# Patient Record
Sex: MALE | Race: WHITE | NOT HISPANIC OR LATINO | Employment: UNEMPLOYED | URBAN - METROPOLITAN AREA
[De-identification: names, ages, dates, MRNs, and addresses within clinical notes are randomized per-mention and may not be internally consistent; named-entity substitution may affect disease eponyms.]

---

## 2020-09-14 ENCOUNTER — HOSPITAL ENCOUNTER (EMERGENCY)
Facility: HOSPITAL | Age: 30
Discharge: PSYCHIATRIC OR SUBSTANCE USE FACILITY | End: 2020-09-15
Attending: EMERGENCY MEDICINE
Payer: MEDICAID

## 2020-09-14 DIAGNOSIS — F32.A DEPRESSION, UNSPECIFIED DEPRESSION TYPE: ICD-10-CM

## 2020-09-14 DIAGNOSIS — F32.A DEPRESSION WITH SUICIDAL IDEATION: Primary | ICD-10-CM

## 2020-09-14 DIAGNOSIS — R45.851 DEPRESSION WITH SUICIDAL IDEATION: Primary | ICD-10-CM

## 2020-09-14 LAB
ALBUMIN SERPL BCP-MCNC: 3.9 G/DL (ref 3.5–5.2)
ALP SERPL-CCNC: 57 U/L (ref 38–126)
ALT SERPL W/O P-5'-P-CCNC: 24 U/L (ref 10–44)
AMPHET+METHAMPHET UR QL: NORMAL
ANION GAP SERPL CALC-SCNC: 5 MMOL/L (ref 8–16)
APAP SERPL-MCNC: 27 UG/ML (ref 10–20)
APAP SERPL-MCNC: <10 UG/ML (ref 10–20)
AST SERPL-CCNC: 52 U/L (ref 15–46)
BACTERIA #/AREA URNS AUTO: ABNORMAL /HPF
BARBITURATES UR QL SCN>200 NG/ML: NEGATIVE
BASOPHILS # BLD AUTO: 0.05 K/UL (ref 0–0.2)
BASOPHILS NFR BLD: 0.6 % (ref 0–1.9)
BENZODIAZ UR QL SCN>200 NG/ML: NEGATIVE
BILIRUB SERPL-MCNC: 0.3 MG/DL (ref 0.1–1)
BILIRUB UR QL STRIP: ABNORMAL
BUN SERPL-MCNC: 18 MG/DL (ref 2–20)
BZE UR QL SCN: NEGATIVE
CALCIUM SERPL-MCNC: 8.7 MG/DL (ref 8.7–10.5)
CANNABINOIDS UR QL SCN: NORMAL
CHLORIDE SERPL-SCNC: 102 MMOL/L (ref 95–110)
CLARITY UR REFRACT.AUTO: ABNORMAL
CO2 SERPL-SCNC: 27 MMOL/L (ref 23–29)
COLOR UR AUTO: ABNORMAL
CREAT SERPL-MCNC: 1.17 MG/DL (ref 0.5–1.4)
CREAT UR-MCNC: >346.5 MG/DL (ref 23–375)
DIFFERENTIAL METHOD: ABNORMAL
EOSINOPHIL # BLD AUTO: 0 K/UL (ref 0–0.5)
EOSINOPHIL NFR BLD: 0.1 % (ref 0–8)
ERYTHROCYTE [DISTWIDTH] IN BLOOD BY AUTOMATED COUNT: 11.9 % (ref 11.5–14.5)
EST. GFR  (AFRICAN AMERICAN): >60 ML/MIN/1.73 M^2
EST. GFR  (NON AFRICAN AMERICAN): >60 ML/MIN/1.73 M^2
ETHANOL SERPL-MCNC: <10 MG/DL
GLUCOSE SERPL-MCNC: 106 MG/DL (ref 70–110)
GLUCOSE UR QL STRIP: NEGATIVE
HCT VFR BLD AUTO: 37.7 % (ref 40–54)
HGB BLD-MCNC: 13 G/DL (ref 14–18)
HGB UR QL STRIP: ABNORMAL
HYALINE CASTS UR QL AUTO: 0 /LPF
IMM GRANULOCYTES # BLD AUTO: 0.04 K/UL (ref 0–0.04)
IMM GRANULOCYTES NFR BLD AUTO: 0.5 % (ref 0–0.5)
KETONES UR QL STRIP: ABNORMAL
LEUKOCYTE ESTERASE UR QL STRIP: ABNORMAL
LYMPHOCYTES # BLD AUTO: 2.2 K/UL (ref 1–4.8)
LYMPHOCYTES NFR BLD: 25.1 % (ref 18–48)
MCH RBC QN AUTO: 33.4 PG (ref 27–31)
MCHC RBC AUTO-ENTMCNC: 34.5 G/DL (ref 32–36)
MCV RBC AUTO: 97 FL (ref 82–98)
METHADONE UR QL SCN>300 NG/ML: NEGATIVE
MICROSCOPIC COMMENT: ABNORMAL
MONOCYTES # BLD AUTO: 0.6 K/UL (ref 0.3–1)
MONOCYTES NFR BLD: 6.4 % (ref 4–15)
NEUTROPHILS # BLD AUTO: 5.8 K/UL (ref 1.8–7.7)
NEUTROPHILS NFR BLD: 67.3 % (ref 38–73)
NITRITE UR QL STRIP: NEGATIVE
NRBC BLD-RTO: 0 /100 WBC
OPIATES UR QL SCN: NEGATIVE
PCP UR QL SCN>25 NG/ML: NEGATIVE
PH UR STRIP: 5 [PH] (ref 5–8)
PLATELET # BLD AUTO: 317 K/UL (ref 150–350)
PMV BLD AUTO: 10 FL (ref 9.2–12.9)
POTASSIUM SERPL-SCNC: 3.5 MMOL/L (ref 3.5–5.1)
PROT SERPL-MCNC: 6.6 G/DL (ref 6–8.4)
PROT UR QL STRIP: ABNORMAL
RBC # BLD AUTO: 3.89 M/UL (ref 4.6–6.2)
RBC #/AREA URNS AUTO: >100 /HPF (ref 0–4)
SALICYLATES SERPL-MCNC: <5 MG/DL (ref 15–30)
SARS-COV-2 RDRP RESP QL NAA+PROBE: NEGATIVE
SODIUM SERPL-SCNC: 134 MMOL/L (ref 136–145)
SP GR UR STRIP: 1.01 (ref 1–1.03)
TOXICOLOGY INFORMATION: NORMAL
URN SPEC COLLECT METH UR: ABNORMAL
UROBILINOGEN UR STRIP-ACNC: ABNORMAL EU/DL
WBC # BLD AUTO: 8.65 K/UL (ref 3.9–12.7)
WBC #/AREA URNS AUTO: 2 /HPF (ref 0–5)

## 2020-09-14 PROCEDURE — U0002 COVID-19 LAB TEST NON-CDC: HCPCS | Mod: ER

## 2020-09-14 PROCEDURE — 90833 PR PSYCHOTHERAPY W/PATIENT W/E&M, 30 MIN (ADD ON): ICD-10-PCS | Mod: 95,AF,HB, | Performed by: PSYCHIATRY & NEUROLOGY

## 2020-09-14 PROCEDURE — 80307 DRUG TEST PRSMV CHEM ANLYZR: CPT | Mod: ER

## 2020-09-14 PROCEDURE — 90833 PSYTX W PT W E/M 30 MIN: CPT | Mod: 95,AF,HB, | Performed by: PSYCHIATRY & NEUROLOGY

## 2020-09-14 PROCEDURE — 81000 URINALYSIS NONAUTO W/SCOPE: CPT | Mod: 59,ER

## 2020-09-14 PROCEDURE — 99215 OFFICE O/P EST HI 40 MIN: CPT | Mod: 95,AF,HB, | Performed by: PSYCHIATRY & NEUROLOGY

## 2020-09-14 PROCEDURE — 99285 EMERGENCY DEPT VISIT HI MDM: CPT | Mod: ER

## 2020-09-14 PROCEDURE — 80329 ANALGESICS NON-OPIOID 1 OR 2: CPT | Mod: ER

## 2020-09-14 PROCEDURE — 99215 PR OFFICE/OUTPT VISIT, EST, LEVL V, 40-54 MIN: ICD-10-PCS | Mod: 95,AF,HB, | Performed by: PSYCHIATRY & NEUROLOGY

## 2020-09-14 PROCEDURE — 80053 COMPREHEN METABOLIC PANEL: CPT | Mod: ER

## 2020-09-14 PROCEDURE — 80320 DRUG SCREEN QUANTALCOHOLS: CPT | Mod: ER

## 2020-09-14 PROCEDURE — 85025 COMPLETE CBC W/AUTO DIFF WBC: CPT | Mod: ER

## 2020-09-14 NOTE — ED NOTES
"Pt c/o p[ain to both feet "I have sore spots on my feet because I been walking so much lately and I need something for pain on my feet" pain 6/10. Dr Lo notified. Dr Lo ordered repeat acetaminophen level ordered. Pt notified.   "

## 2020-09-14 NOTE — ED PROVIDER NOTES
Chief Complaint  Chief Complaint   Patient presents with    Suicidal     My mind is not thinking right.I had a knife but i threw it out when i called the ambulance. I want to hurt myself. My ex played me and ran off with my kids and left me with nothing and I just dont want to be here anymore       HPI  Frankie Fragoso is a 30 y.o. male who presents with depression and stress.  Patient reports he is struggling coping with his depression.  His ex-wife ran off with his kids per his account.  He has not tried to harm himself but he consider hurting himself a knife.  He denies any homicidal ideation.  No hallucinations.  No fever vomiting or diarrhea or cough.  No exacerbating or relieving factors.  The patient did not take any overdose or pills today.  He denies intoxication    Past medical history  Past Medical History:   Diagnosis Date    Anxiety     Back pain     Depression        Current Medications  No current facility-administered medications for this encounter.   No current outpatient medications on file.    Allergies  Review of patient's allergies indicates:   Allergen Reactions    Ceclor [cefaclor]        Surgical history  Past Surgical History:   Procedure Laterality Date    ENDOSCOPIC RELEASE OF BOTH CARPAL TUNNELS      KNEE ARTHROSCOPY         Social history  Social History     Socioeconomic History    Marital status: Unknown     Spouse name: Not on file    Number of children: Not on file    Years of education: Not on file    Highest education level: Not on file   Occupational History    Not on file   Social Needs    Financial resource strain: Not on file    Food insecurity     Worry: Not on file     Inability: Not on file    Transportation needs     Medical: Not on file     Non-medical: Not on file   Tobacco Use    Smoking status: Current Every Day Smoker     Packs/day: 0.50     Types: Cigarettes    Smokeless tobacco: Never Used   Substance and Sexual Activity    Alcohol use: Not  "Currently    Drug use: Yes     Types: Methamphetamines     Comment: meth     Sexual activity: Not on file   Lifestyle    Physical activity     Days per week: Not on file     Minutes per session: Not on file    Stress: Not on file   Relationships    Social connections     Talks on phone: Not on file     Gets together: Not on file     Attends Latter-day service: Not on file     Active member of club or organization: Not on file     Attends meetings of clubs or organizations: Not on file     Relationship status: Not on file   Other Topics Concern    Not on file   Social History Narrative    Not on file       Family History  History reviewed. No pertinent family history.    Review of systems  Skin: No rash, abscess, or laceration.  Neurologic: No new focal weakness or sensory changes.  All systems otherwise negative except as noted in ROS and HPI    Physical Exam  Vital signs: /76   Pulse 80   Temp 98.1 °F (36.7 °C) (Oral)   Resp 16   Ht 5' 4" (1.626 m)   Wt 56.7 kg (125 lb)   SpO2 98%   BMI 21.46 kg/m²   Constitutional: No acute distress.  Well developed, alert, oriented and appropriate.  HENT: Normocephalic, atraumatic. Normal ear, nose, and throat.  Eyes: PERRL, EOMI, normal conjunctiva.  Neck: Normal range of motion, no tenderness; supple.  Respiratory: Nonlabored breathing with normal breath sounds.  Cardiovascular: RRR with no pulse deficit.  GI: Soft, nontender, no rebound or guarding.  Musculoskeletal: Normal ROM, no tenderness, injury, or edema.  Skin: Warm, dry skin without infection or injury.  Neurologic: Normal motor, sensation with no new focal deficit.  Psychiatric: Affect is flat, judgement normal, mood is depressed.  Patient voices suicidal thoughts.    Labs  Pertinent labs reviewed (see chart for details)  Labs Reviewed   CBC W/ AUTO DIFFERENTIAL - Abnormal; Notable for the following components:       Result Value    RBC 3.89 (*)     Hemoglobin 13.0 (*)     Hematocrit 37.7 (*)     " Mean Corpuscular Hemoglobin 33.4 (*)     All other components within normal limits   COMPREHENSIVE METABOLIC PANEL - Abnormal; Notable for the following components:    Sodium 134 (*)     AST 52 (*)     Anion Gap 5 (*)     All other components within normal limits   URINALYSIS, REFLEX TO URINE CULTURE - Abnormal; Notable for the following components:    Color, UA Brown (*)     Appearance, UA Cloudy (*)     Protein, UA 1+ (*)     Ketones, UA 1+ (*)     Bilirubin (UA) 1+ (*)     Occult Blood UA 3+ (*)     Urobilinogen, UA 4.0-6.0 (*)     Leukocytes, UA 1+ (*)     All other components within normal limits    Narrative:     Specimen Source->Urine   ACETAMINOPHEN LEVEL - Abnormal; Notable for the following components:    Acetaminophen (Tylenol), Serum 27.0 (*)     All other components within normal limits   SALICYLATE LEVEL - Abnormal; Notable for the following components:    Salicylate Lvl <5.0 (*)     All other components within normal limits   URINALYSIS MICROSCOPIC - Abnormal; Notable for the following components:    RBC, UA >100 (*)     Bacteria Moderate (*)     All other components within normal limits    Narrative:     Specimen Source->Urine   DRUG SCREEN PANEL, URINE EMERGENCY    Narrative:     Specimen Source->Urine   ALCOHOL,MEDICAL (ETHANOL)   SARS-COV-2 RNA AMPLIFICATION, QUAL       ECG  No results found for this or any previous visit.  ECG interpreted by ED MD    Radiology    No orders to display       Procedures  Procedures    Medications - No data to display    ED course and medical decision making    ED Course as of Sep 14 1425   Mon Sep 14, 2020   1403 Patient does report taking for Tylenol only this morning secondary to some chronic back pain.  He denies any suicidal attempt or gesture.    [MB]      ED Course User Index  [MB] Saúl Lo MD       Patient does appear to be suffering from depression.  We will consult Psychiatry for further guidance and assistance.    Psychiatry evaluated patient  feels he would benefit from inpatient evaluation currently.  Patient is medically cleared for transfer to psychiatric facility    Disposition    Patient transferred in stable condition      Final impression  1. Depression with suicidal ideation    2. Depression, unspecified depression type        Critical care time spent with this patient was 30 minutes excluding the procedure time.          Saúl Lo MD  09/14/20 1474

## 2020-09-14 NOTE — CONSULTS
[facility]  Psychiatry  Telepsychiatry Consult Note    Please see previous notes: no prior psychiatric notes found in chart     Patient agreeable to consultation via telepsychiatry.    Tele-Consultation from Psychiatry started: 9/14/2020 at 1:15 PM  The chief complaint leading to psychiatric consultation is: SI and depression   This consultation was requested by Dr. Lo, the Emergency Department attending physician.  The location of the consulting psychiatrist is [place], [place]  The patient location is  [facility] EMERGENCY DEPARTMENT   The patient arrived at the ED at: unknown  Also present with the patient at the time of the consultation:nurse/tech     Patient Identification:   Frankie Fragoso is a 30 y.o. male.    Patient information was obtained from patient and ED MD.  Patient presented voluntarily to the Emergency Department via EMS     Inpatient consult to Psychiatric Telemedicine  Consult performed by: Leobardo Johnson MD  Consult ordered by: Saúl Lo MD        Subjective:     Per ED MD:  Chief Complaint       Chief Complaint   Patient presents with    Suicidal       My mind is not thinking right.I had a knife but i threw it out when i called the ambulance. I want to hurt myself. My ex played me and ran off with my kids and left me with nothing and I just dont want to be here anymore   Frankie Fragoso is a 30 y.o. male who presents with depression and stress.  Patient reports he is struggling coping with his depression.  His ex-wife ran off with his kids per his account.  He has not tried to harm himself but he consider hurting himself a knife.  He denies any homicidal ideation.  No hallucinations.  No fever vomiting or diarrhea or cough.  No exacerbating or relieving factors.  The patient did not take any overdose or pills today.  He denies intoxication    Chief Complaint / Reason for Psychiatry Consult: SI and Depression       HPI   Frankie Fragoso is a 30 y.o. male with a past  medical history of back pain and a past psychiatric history of MDD, anxiety, and polysubstance abuse, currently in the ED for assessment of depression and SI as noted above.  Psychiatry was originally consulted as noted above.  The patient was seen and examined.  The chart was reviewed.  On examination today, the patient was calm and cooperative but appeared notably blunted/depressed.  He endorses a multi-year history of depression and anxiety complicated by drug abuse (THC and meth) with significant worsening over the past week related to the stressor of his ex-GF taking his kids and leaving him.  He endorses current/recent symptoms of depression and anxiety (as noted below in detailed psych ROS) with active SI (thoughts of jumping off of a building or cutting self with knife).  He denies being on any psychiatric medications in over a year.  He denies any AH, VH, delusions, paranoia, or chemo (no objective signs of psychosis or chemo were observed).  He denies any current/recent passive/active HI.  Regarding current medical/physical complaints, her endorses bilateral painful foot blisters.  He denies any other medical complaints at this time.  NAD was observed during the examination.  Patient endorses poor sleep and poor appetite as noted below.  Of note, he is open to the thought of residential rehab s/p inpatient psychiatric stabilization/treatment.        Psychiatric Review Of Systems - Currently, the patient is endorsing and/or denying the following:    Endorses symptoms of depression such as diminished mood, loss of interest/anhedonia, irritability, low motivation, diminished energy, poor sleep, poor appetite, diminished concentration, excessive guilt, hopelessness, worthlessness, and active suicidal ideations with thoughts of jumping off of a building or cutting self with knife     Endorses difficulty with sleep initiation & maintenance     Endorses Active Suicidal Ideations (as noted above); denies  active/passive HI     Denies Symptoms of psychosis: hallucinations, delusions, disorganized thinking, disorganized behavior or abnormal motor behavior, or negative symptoms (diminshed emotional expression, avolition, anhedonia, alogia, asociality     Denies Symptoms of chemo or hypomania: elevated, expansive, or irritable mood with increased energy or activity; with inflated self-esteem or grandiosity, decreased need for sleep, increased rate of speech, FOI or racing thoughts, distractibility, increased goal directed activity or PMA, risky/disinhibited behavior    Endorses symptoms of anxiety such as excessive worry/fear, more days than not, about numerous issues, difficult to control, with restlessness, fatigue, poor concentration, irritability, muscle tension, sleep disturbance; causes functionally impairing distress     Denies Symptoms of Panic Disorder: recurrent panic attacks, precipitated or un-precipitated, source of worry and/or behavioral changes secondary; with or without agoraphobia    Denies Symptoms of PTSD: h/o trauma; re-experiencing/intrusive symptoms, avoidant behavior, negative alterations in cognition or mood, or hyperarousal symptoms; with or without dissociative symptoms     Denies Symptoms of OCD: obsessions or compulsions     Denies Symptoms of Eating Disorders: anorexia, bulimia or binging    Endorses Substance Use/Abuse (cannabis and methamphetamines): intoxication, withdrawal, tolerance, used in larger amounts or duration than intended, unsuccessful attempts to limit or quit, increased time engaging in or seeking out, cravings or strong desire to use, failure to fulfill obligations, negative consequences in social/interpersonal/occupational,/recreational areas, use in dangerous situations, medical or psychological consequences       PSYCHOTHERAPY ADD-ON +[id number]   30 (16-37*) minutes    Time: 16 minutes  Participants: Met with patient    Therapeutic Intervention Type: behavior modifying  psychotherapy, supportive psychotherapy  Why chosen therapy is appropriate versus another modality: relevant to diagnosis, patient responds to this modality, evidence based practice    Target symptoms: recurrent depression, anxiety , substance abuse  Primary focus: depression and polysubstance abuse   Psychotherapeutic techniques: supportive and psychodynamic techniques; psycho-education; deep breathing exercises; motivational interviewing; CBT; problem solving techniques and managing life stressors    Outcome monitoring methods: self-report, observation    Patient's response to intervention:  The patient's response to intervention is accepting, guarded.    Progress toward goals:  The patient's progress toward goals is fair .      ROS  General ROS: negative for - chills, fatigue, fever or night sweats  Ophthalmic ROS: negative for - blurry vision, double vision or eye pain  ENT ROS: negative for - sinus pain, headaches, sore throat or visual changes  Allergy and Immunology ROS: negative for - hives, itchy/watery eyes or nasal congestion  Hematological and Lymphatic ROS: negative for - bleeding problems, bruising, jaundice or pallor  Endocrine ROS: negative for - galactorrhea, hot flashes, mood swings, palpitations or temperature intolerance  Respiratory ROS: negative for - cough, hemoptysis, shortness of breath, tachypnea or wheezing  Cardiovascular ROS: negative for - chest pain, dyspnea on exertion, loss of consciousness, palpitations, rapid heart rate or shortness of breath  Gastrointestinal ROS: negative for - appetite loss, nausea, abdominal pain, blood in stools, change in bowel habits, constipation or diarrhea  Genito-Urinary ROS: negative for - incontinence, nocturia or pelvic pain  Musculoskeletal ROS: negative for - joint stiffness, joint swelling, joint pain or muscle pain   Neurological ROS: negative for - behavioral changes, confusion, dizziness, memory loss, numbness/tingling or  "seizures  Dermatological ROS: negative for dry skin, hair changes, pruritus or rash; + for painful blisters on bilateral feet   Psychiatric ROS: see detailed psychiatric ROS above in history section       Psychiatric History:   Previous Psychiatric Hospitalizations: Yes, once in [place] around 2015 for depression   Previous Medication Trials: Yes, unknown depression medication and Vistaril PRN anxiety   Previous Suicide Attempts: denies  History of Violence: denies  History of Depression: yes  History of Anxiety: yes   History of Rosaura: denies outside of the context of methamphetamine abuse   History of Auditory/Visual Hallucination: denies outside of the context of methamphetamine abuse   History of Delusions: denies  Outpatient psychiatrist: denies     Substance Abuse History:  Tobacco: Yes, 1/2 PPD x 15 years  Alcohol: denies  Illicit Substances: daily cannabis and methamphetamine abuse (snorting) x multiple years (denies IV use hx)  Detox/Rehab: detox previously in [place]; denies rehab hx     Legal History: Past charges/incarcerations: care home for drug possession once previously     Family Psychiatric History: "my whole family is crazy" ; denies specific diagnoses     Social History:  Developmental/Childhood:Achieved all developmental milestones timely  Education:High School Diploma  Employment Status/Finances:Unemployed   Relationship Status/Sexual Orientation: Single:  Recent separation from  as noted in HPI  Children: 5  Housing Status: living in [facility] in [place]     history:  denies  Access to gun: denies  Alevism: "Mormon"  Recreational activities: "I like to spend time with my kids, but I don't think that will ever happen again"    Neurological History:  Seizures: "epilepsy as a baby" (last episode at age 1)  Head trauma: denies    Past Medical & Surgical History:   Past Medical History:   Diagnosis Date    Anxiety     Back pain     Depression       Past Surgical History:   Procedure " Laterality Date    ENDOSCOPIC RELEASE OF BOTH CARPAL TUNNELS      KNEE ARTHROSCOPY       Family History:  History reviewed. No pertinent family history.    Laboratory Data:   Labs Reviewed   CBC W/ AUTO DIFFERENTIAL - Abnormal; Notable for the following components:       Result Value    RBC 3.89 (*)     Hemoglobin 13.0 (*)     Hematocrit 37.7 (*)     Mean Corpuscular Hemoglobin 33.4 (*)     All other components within normal limits   COMPREHENSIVE METABOLIC PANEL - Abnormal; Notable for the following components:    Sodium 134 (*)     AST 52 (*)     Anion Gap 5 (*)     All other components within normal limits   URINALYSIS, REFLEX TO URINE CULTURE - Abnormal; Notable for the following components:    Color, UA Brown (*)     Appearance, UA Cloudy (*)     Protein, UA 1+ (*)     Ketones, UA 1+ (*)     Bilirubin (UA) 1+ (*)     Occult Blood UA 3+ (*)     Urobilinogen, UA 4.0-6.0 (*)     Leukocytes, UA 1+ (*)     All other components within normal limits    Narrative:     Specimen Source->Urine   ACETAMINOPHEN LEVEL - Abnormal; Notable for the following components:    Acetaminophen (Tylenol), Serum 27.0 (*)     All other components within normal limits   SALICYLATE LEVEL - Abnormal; Notable for the following components:    Salicylate Lvl <5.0 (*)     All other components within normal limits   URINALYSIS MICROSCOPIC - Abnormal; Notable for the following components:    RBC, UA >100 (*)     Bacteria Moderate (*)     All other components within normal limits    Narrative:     Specimen Source->Urine   ALCOHOL,MEDICAL (ETHANOL)   DRUG SCREEN PANEL, URINE EMERGENCY   SARS-COV-2 RNA AMPLIFICATION, QUAL       Allergies:   Review of patient's allergies indicates:   Allergen Reactions    Ceclor [cefaclor]      No current facility-administered medications on file prior to encounter.      No current outpatient medications on file prior to encounter.     Scheduled Meds: none  Continuous Infusions: none  PRN Meds: none    Medications  "in ER: Medications - No data to display    Psychiatric Medications at home: denies    No new subjective & objective note has been filed under this hospital service since the last note was generated.      EXAMINATION    VITALS   Vitals:    09/14/20 1217   BP: 116/76   Pulse: 80   Resp: 16   Temp: 98.1 °F (36.7 °C)   TempSrc: Oral   SpO2: 98%   Weight: 56.7 kg (125 lb)   Height: 5' 4" (1.626 m)     CONSTITUTIONAL  General Appearance: NAD, unremarkable, age appropriate, normal weight, lying in bed    MUSCULOSKELETAL  Muscle Strength and Tone: WNL    Abnormal Involuntary Movements: none observed   Gait and Station: WNL; non-ataxic     PSYCHIATRIC   Behavior/Cooperation:  cooperative, psychomotor retardation, eye contact minimal  Speech:  normal tone, normal pitch, slowed, soft  Language: grossly intact, able to name, able to repeat with spontaneous speech  Mood: "depressed"  Affect:  congruent with mood ; blunted   Associations: intact; no ADRIANA  Thought Process: Linear  Thought Content: + active SI; denies HI, AH, VH, delusions, or paranoia (no RIS observed)  Sensorium:  Awake  Alert and Oriented: to person, place, situation, month of year, year  Memory: 3/3 immediate, 2/3 at 5 minutes    Recent: Intact; able to report recent events   Remote: Intact; Named 3/4 past presidents   Attention/concentration: appropriate for age/education. Able to spell [id number] & [id number]   Similarities:  Intact; (difference between apple and orange?)  Abstract reasoning:  Intact  Insight:  Limited / Intact  Judgment: Impaired / Limited    CAM ICU Delirium Assessment - NEGATIVE      Assessment - Diagnosis - Goals:     Diagnosis/Impression:   MDD, recurrent, severe, without psychosis (w/ active SI)  Unspecified Anxiety Disorder  Methamphetamine Use Disorder, Severe, Dependence   Cannabis Abuse  (rule out SIMD)    Rec:  - Implement/continue PEC due to patient being a threat to self in the context of mental illness (depression with active " SI)    - Once medically cleared, seek inpatient psychiatric admission for treatment / stabilization     - Defer scheduled psychiatric medications to inpatient psychiatric treatment team (patient could benefit from a washout period given polysubstance abuse)    - Defer non-psychiatric medications to the ED MD    - Can use Zyprexa 10 mg PO/IM q8 hours PRN for non-redirectable psychotic / manic agitation (do not give within one hour of any benzodiazepine use)    - Continue suicide / violence precautions and monitor patient with sitter while awaiting inpatient psychiatric admission       Time with patient: 60 minutes     More than 50% of the time was spent counseling/coordinating care    Consulting clinician was informed of the encounter and consult note.    Consultation ended: 9/14/2020 at 2:15 PM       Leobardo Johnson MD   Psychiatry  [facility]  09/14/2020

## 2020-09-14 NOTE — ED NOTES
Pt eyes closed resting comfortably. Sitter and security at doorway. Pt remains PEC'd awaiting placement.

## 2020-09-15 VITALS
WEIGHT: 125 LBS | DIASTOLIC BLOOD PRESSURE: 67 MMHG | SYSTOLIC BLOOD PRESSURE: 119 MMHG | HEART RATE: 67 BPM | HEIGHT: 64 IN | OXYGEN SATURATION: 100 % | BODY MASS INDEX: 21.34 KG/M2 | RESPIRATION RATE: 20 BRPM | TEMPERATURE: 98 F

## 2020-09-15 PROBLEM — F32.A DEPRESSION WITH SUICIDAL IDEATION: Status: ACTIVE | Noted: 2020-09-15

## 2020-09-15 PROBLEM — R45.851 DEPRESSION WITH SUICIDAL IDEATION: Status: ACTIVE | Noted: 2020-09-15

## 2020-09-15 NOTE — ED NOTES
Report received from CANDIDO Appiah RN.   Assumed care of pt.   Pt is sleeping on stretcher c respirations even, unlabored c NADn.   Bed locked and low c side rails up.   Lights dim and blanket provided for comfort.   PCT at bedside for 1:1 observation.

## 2020-09-15 NOTE — ED NOTES
Pt is sleeping on stretcher c respirations even, unlabored c NADn.   Bed locked and low c side rails up.   Lights dim and blanket provided for comfort.   PCT at bedside for 1:1 observation.

## 2020-09-15 NOTE — ED NOTES
"Per Jannette, DORIS "Patient accepted by Alyson at [facility] ([address] [place] [address]) for the service of Dr. Siddiqui.  Report to be called to [phone].  [facility] will arrange transport.."  "

## 2020-09-27 ENCOUNTER — HOSPITAL ENCOUNTER (EMERGENCY)
Facility: HOSPITAL | Age: 30
Discharge: PSYCHIATRIC OR SUBSTANCE USE FACILITY | End: 2020-09-27
Attending: EMERGENCY MEDICINE
Payer: MEDICAID

## 2020-09-27 VITALS
OXYGEN SATURATION: 99 % | SYSTOLIC BLOOD PRESSURE: 128 MMHG | HEART RATE: 88 BPM | RESPIRATION RATE: 18 BRPM | DIASTOLIC BLOOD PRESSURE: 78 MMHG | TEMPERATURE: 99 F

## 2020-09-27 DIAGNOSIS — R45.851 SUICIDAL IDEATION: Primary | ICD-10-CM

## 2020-09-27 LAB
ALBUMIN SERPL BCP-MCNC: 4.2 G/DL (ref 3.5–5.2)
ALP SERPL-CCNC: 74 U/L (ref 55–135)
ALT SERPL W/O P-5'-P-CCNC: 43 U/L (ref 10–44)
AMPHET+METHAMPHET UR QL: NORMAL
ANION GAP SERPL CALC-SCNC: 4 MMOL/L (ref 8–16)
APAP SERPL-MCNC: <10 UG/ML (ref 10–20)
AST SERPL-CCNC: 26 U/L (ref 10–40)
BACTERIA #/AREA URNS HPF: NEGATIVE /HPF
BARBITURATES UR QL SCN>200 NG/ML: NEGATIVE
BASOPHILS # BLD AUTO: 0.06 K/UL (ref 0–0.2)
BASOPHILS NFR BLD: 0.4 % (ref 0–1.9)
BENZODIAZ UR QL SCN>200 NG/ML: NORMAL
BILIRUB SERPL-MCNC: 0.5 MG/DL (ref 0.1–1)
BILIRUB UR QL STRIP: NEGATIVE
BUN SERPL-MCNC: 13 MG/DL (ref 6–20)
BZE UR QL SCN: NEGATIVE
CALCIUM SERPL-MCNC: 9 MG/DL (ref 8.7–10.5)
CANNABINOIDS UR QL SCN: NORMAL
CHLORIDE SERPL-SCNC: 105 MMOL/L (ref 95–110)
CLARITY UR: CLEAR
CO2 SERPL-SCNC: 30 MMOL/L (ref 23–29)
COLOR UR: YELLOW
CREAT SERPL-MCNC: 1 MG/DL (ref 0.5–1.4)
CREAT UR-MCNC: 285 MG/DL (ref 23–375)
DIFFERENTIAL METHOD: ABNORMAL
EOSINOPHIL # BLD AUTO: 0 K/UL (ref 0–0.5)
EOSINOPHIL NFR BLD: 0.1 % (ref 0–8)
ERYTHROCYTE [DISTWIDTH] IN BLOOD BY AUTOMATED COUNT: 12.9 % (ref 11.5–14.5)
EST. GFR  (AFRICAN AMERICAN): >60 ML/MIN/1.73 M^2
EST. GFR  (NON AFRICAN AMERICAN): >60 ML/MIN/1.73 M^2
ETHANOL SERPL-MCNC: <3 MG/DL
GLUCOSE SERPL-MCNC: 87 MG/DL (ref 70–110)
GLUCOSE UR QL STRIP: NEGATIVE
HCT VFR BLD AUTO: 41.6 % (ref 40–54)
HGB BLD-MCNC: 13.8 G/DL (ref 14–18)
HGB UR QL STRIP: ABNORMAL
HYALINE CASTS #/AREA URNS LPF: 2 /LPF
IMM GRANULOCYTES # BLD AUTO: 0.05 K/UL (ref 0–0.04)
IMM GRANULOCYTES NFR BLD AUTO: 0.4 % (ref 0–0.5)
KETONES UR QL STRIP: NEGATIVE
LEUKOCYTE ESTERASE UR QL STRIP: ABNORMAL
LYMPHOCYTES # BLD AUTO: 2.1 K/UL (ref 1–4.8)
LYMPHOCYTES NFR BLD: 14.7 % (ref 18–48)
MCH RBC QN AUTO: 32.1 PG (ref 27–31)
MCHC RBC AUTO-ENTMCNC: 33.2 G/DL (ref 32–36)
MCV RBC AUTO: 97 FL (ref 82–98)
METHADONE UR QL SCN>300 NG/ML: NEGATIVE
MICROSCOPIC COMMENT: ABNORMAL
MONOCYTES # BLD AUTO: 0.9 K/UL (ref 0.3–1)
MONOCYTES NFR BLD: 6 % (ref 4–15)
NEUTROPHILS # BLD AUTO: 11.1 K/UL (ref 1.8–7.7)
NEUTROPHILS NFR BLD: 78.4 % (ref 38–73)
NITRITE UR QL STRIP: NEGATIVE
NRBC BLD-RTO: 0 /100 WBC
OPIATES UR QL SCN: NEGATIVE
PCP UR QL SCN>25 NG/ML: NEGATIVE
PH UR STRIP: 7 [PH] (ref 5–8)
PLATELET # BLD AUTO: 349 K/UL (ref 150–350)
PMV BLD AUTO: 9.2 FL (ref 9.2–12.9)
POTASSIUM SERPL-SCNC: 3.8 MMOL/L (ref 3.5–5.1)
PROT SERPL-MCNC: 7.8 G/DL (ref 6–8.4)
PROT UR QL STRIP: NEGATIVE
RBC # BLD AUTO: 4.3 M/UL (ref 4.6–6.2)
RBC #/AREA URNS HPF: 16 /HPF (ref 0–4)
SARS-COV-2 RDRP RESP QL NAA+PROBE: NEGATIVE
SODIUM SERPL-SCNC: 139 MMOL/L (ref 136–145)
SP GR UR STRIP: 1.02 (ref 1–1.03)
SQUAMOUS #/AREA URNS HPF: 1 /HPF
TOXICOLOGY INFORMATION: NORMAL
TSH SERPL DL<=0.005 MIU/L-ACNC: 1.15 UIU/ML (ref 0.4–4)
URN SPEC COLLECT METH UR: ABNORMAL
UROBILINOGEN UR STRIP-ACNC: 1 EU/DL
WBC # BLD AUTO: 14.2 K/UL (ref 3.9–12.7)
WBC #/AREA URNS HPF: 2 /HPF (ref 0–5)

## 2020-09-27 PROCEDURE — 80053 COMPREHEN METABOLIC PANEL: CPT

## 2020-09-27 PROCEDURE — 80329 ANALGESICS NON-OPIOID 1 OR 2: CPT

## 2020-09-27 PROCEDURE — 99285 EMERGENCY DEPT VISIT HI MDM: CPT

## 2020-09-27 PROCEDURE — 84443 ASSAY THYROID STIM HORMONE: CPT

## 2020-09-27 PROCEDURE — 80320 DRUG SCREEN QUANTALCOHOLS: CPT

## 2020-09-27 PROCEDURE — 81000 URINALYSIS NONAUTO W/SCOPE: CPT | Mod: 59

## 2020-09-27 PROCEDURE — 85025 COMPLETE CBC W/AUTO DIFF WBC: CPT

## 2020-09-27 PROCEDURE — 36415 COLL VENOUS BLD VENIPUNCTURE: CPT

## 2020-09-27 PROCEDURE — 80307 DRUG TEST PRSMV CHEM ANLYZR: CPT

## 2020-09-27 PROCEDURE — U0002 COVID-19 LAB TEST NON-CDC: HCPCS

## 2020-09-27 NOTE — ED NOTES
Spoke to Meghan on [facility]. States that they are currently at max capacity due to covid regulations. States that patient will need to be transferred out for psych placement.
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
## 2020-09-27 NOTE — ED NOTES
Per Hope with [facility], pt accepted by Dr. Roque at [facility] in [place].  Can call report to [phone].  We will need to setup transportation.

## 2020-09-28 NOTE — ED NOTES
[facility] called stating that transportation notified that patient will be picked up at 9:45pm from hospitals to transfer to [place]

## 2020-11-02 ENCOUNTER — OFFICE VISIT (OUTPATIENT)
Dept: FAMILY MEDICINE | Facility: CLINIC | Age: 30
End: 2020-11-02
Payer: MEDICAID

## 2020-11-02 VITALS
BODY MASS INDEX: 21.4 KG/M2 | OXYGEN SATURATION: 98 % | TEMPERATURE: 99 F | HEART RATE: 111 BPM | DIASTOLIC BLOOD PRESSURE: 86 MMHG | RESPIRATION RATE: 18 BRPM | HEIGHT: 64 IN | WEIGHT: 125.38 LBS | SYSTOLIC BLOOD PRESSURE: 134 MMHG

## 2020-11-02 DIAGNOSIS — F32.A ANXIETY AND DEPRESSION: ICD-10-CM

## 2020-11-02 DIAGNOSIS — F41.9 ANXIETY AND DEPRESSION: ICD-10-CM

## 2020-11-02 DIAGNOSIS — Z76.89 ENCOUNTER TO ESTABLISH CARE: Primary | ICD-10-CM

## 2020-11-02 DIAGNOSIS — M47.819 SPONDYLO-ARTHROPATHY: ICD-10-CM

## 2020-11-02 DIAGNOSIS — Z00.00 LABORATORY EXAM ORDERED AS PART OF ROUTINE GENERAL MEDICAL EXAMINATION: ICD-10-CM

## 2020-11-02 PROCEDURE — 99203 PR OFFICE/OUTPT VISIT, NEW, LEVL III, 30-44 MIN: ICD-10-PCS | Mod: S$GLB,,, | Performed by: NURSE PRACTITIONER

## 2020-11-02 PROCEDURE — 99203 OFFICE O/P NEW LOW 30 MIN: CPT | Mod: S$GLB,,, | Performed by: NURSE PRACTITIONER

## 2020-11-02 RX ORDER — BUSPIRONE HYDROCHLORIDE 10 MG/1
15 TABLET ORAL 3 TIMES DAILY
COMMUNITY
End: 2020-11-02 | Stop reason: SDUPTHER

## 2020-11-02 RX ORDER — OLANZAPINE 10 MG/1
10 TABLET ORAL NIGHTLY
Qty: 30 TABLET | Refills: 5 | Status: SHIPPED | OUTPATIENT
Start: 2020-11-02 | End: 2021-05-01

## 2020-11-02 RX ORDER — BACLOFEN 10 MG/1
10 TABLET ORAL 3 TIMES DAILY
Qty: 90 TABLET | Refills: 5 | Status: SHIPPED | OUTPATIENT
Start: 2020-11-02 | End: 2021-05-01

## 2020-11-02 RX ORDER — GABAPENTIN 600 MG/1
600 TABLET ORAL 3 TIMES DAILY
Qty: 90 TABLET | Refills: 0 | Status: SHIPPED | OUTPATIENT
Start: 2020-11-02 | End: 2020-11-30

## 2020-11-02 RX ORDER — DESVENLAFAXINE 100 MG/1
100 TABLET, EXTENDED RELEASE ORAL DAILY
Qty: 30 TABLET | Refills: 5 | Status: SHIPPED | OUTPATIENT
Start: 2020-11-02 | End: 2021-05-01

## 2020-11-02 RX ORDER — SERTRALINE HYDROCHLORIDE 100 MG/1
100 TABLET, FILM COATED ORAL DAILY
Qty: 30 TABLET | Refills: 5 | Status: SHIPPED | OUTPATIENT
Start: 2020-11-02 | End: 2021-05-01

## 2020-11-02 RX ORDER — DESVENLAFAXINE 100 MG/1
100 TABLET, EXTENDED RELEASE ORAL DAILY
COMMUNITY
End: 2020-11-02 | Stop reason: SDUPTHER

## 2020-11-02 RX ORDER — BACLOFEN 10 MG/1
10 TABLET ORAL 3 TIMES DAILY
COMMUNITY
End: 2020-11-02 | Stop reason: SDUPTHER

## 2020-11-02 RX ORDER — BUSPIRONE HYDROCHLORIDE 15 MG/1
15 TABLET ORAL 3 TIMES DAILY
Qty: 90 TABLET | Refills: 5 | Status: SHIPPED | OUTPATIENT
Start: 2020-11-02 | End: 2021-05-01

## 2020-11-02 NOTE — PROGRESS NOTES
Clinic Note  11/2/2020      Subjective:       Patient ID:  Frankie is a 30 y.o. male being seen for a new visit.      Chief Complaint: Establish Care (release from rehab, facility set up appointment )    HPI   Frankie is a 30 year old male in clinic to establish care with PCP. Recently released from rehab for meth and heroin abuse. Denies substance abuse or thoughts of suicide. [facility] is residing [facility]. Needs medication refills. Needs referral to Psychiatry.     [facility] of Left meniscus repair, June 2020 in [place]- states that he is still having occasional swelling and popping noise since surgery. 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 Treated successfully with baclofen and gabapentin.     The following portions of the patient's history were reviewed and updated as appropriate: allergies, current medications, past family history, past medical history, past social history, past surgical history and problem list.      Family History   Problem Relation Age of Onset    Lupus Mother     Heart disease Father     Heart disease Maternal Grandmother      Social History     Socioeconomic History    Marital status: Single     Spouse name: Not on file    Number of children: 2    Years of education: Not on file    Highest education level: Not on file   Occupational History    Not on file   Social Needs    Financial resource strain: Not on file    Food insecurity     Worry: Not on file     Inability: Not on file    Transportation needs     Medical: Not on file     Non-medical: Not on file   Tobacco Use    Smoking status: Current Every Day Smoker     Packs/day: 0.50     Types: Cigarettes    Smokeless tobacco: Never Used   Substance and Sexual Activity    Alcohol use: Not Currently    Drug use: Yes     Types: Methamphetamines, Marijuana, Heroin     Comment: over a month with no drug us     Sexual activity: Yes     Partners: Female   Lifestyle    Physical  activity     Days per week: Not on file     Minutes per session: Not on file    Stress: Not on file   Relationships    Social connections     Talks on phone: Not on file     Gets together: Not on file     Attends Buddhism service: Not on file     Active member of club or organization: Not on file     Attends meetings of clubs or organizations: Not on file     Relationship status: Not on file   Other Topics Concern    Patient feels they ought to cut down on drinking/drug use Not Asked    Patient annoyed by others criticizing their drinking/drug use Not Asked    Patient has felt bad or guilty about drinking/drug use Not Asked    Patient has had a drink/used drugs as an eye opener in the AM Not Asked   Social History Narrative    Not on file     Past Surgical History:   Procedure Laterality Date    ENDOSCOPIC RELEASE OF BOTH CARPAL TUNNELS      KNEE ARTHROSCOPY       Patient Active Problem List   Diagnosis    Depression with suicidal ideation       Review of Systems   Constitutional: Negative for chills, fever and weight loss.   HENT: Negative for congestion and sore throat.    Respiratory: Negative for cough, sputum production, shortness of breath and wheezing.    Cardiovascular: Negative for chest pain and palpitations.   Gastrointestinal: Negative for constipation, diarrhea, nausea and vomiting.   Genitourinary: Negative for dysuria, frequency, hematuria and urgency.   Musculoskeletal: Positive for back pain. Negative for joint pain, myalgias and neck pain.   Psychiatric/Behavioral: Positive for depression and substance abuse. Negative for suicidal ideas. The patient is not nervous/anxious.         Meth and her, x 1 month           Medication List with Changes/Refills   Changed and/or Refilled Medications    Modified Medication Previous Medication    BACLOFEN (LIORESAL) 10 MG TABLET baclofen (LIORESAL) 10 MG tablet       Take 1 tablet (10 mg total) by mouth 3 (three) times daily.    Take 10 mg by mouth 3  "(three) times daily.    BUSPIRONE (BUSPAR) 15 MG TABLET busPIRone (BUSPAR) 10 MG tablet       Take 1 tablet (15 mg total) by mouth 3 (three) times daily.    Take 15 mg by mouth 3 (three) times daily.    DESVENLAFAXINE SUCCINATE (PRISTIQ) 100 MG TB24 desvenlafaxine succinate (PRISTIQ) 100 MG Tb24       Take 1 tablet (100 mg total) by mouth once daily.    Take 100 mg by mouth once daily.    GABAPENTIN (NEURONTIN) 600 MG TABLET gabapentin (NEURONTIN) 600 MG tablet       Take 1 tablet (600 mg total) by mouth 3 (three) times daily.    Take 1 tablet (600 mg total) by mouth 3 (three) times daily.    OLANZAPINE (ZYPREXA) 10 MG TABLET OLANZapine (ZYPREXA) 10 MG tablet       Take 1 tablet (10 mg total) by mouth every evening.    Take 1 tablet (10 mg total) by mouth every evening.    SERTRALINE (ZOLOFT) 100 MG TABLET sertraline (ZOLOFT) 100 MG tablet       Take 1 tablet (100 mg total) by mouth once daily.    Take 1 tablet (100 mg total) by mouth once daily.       Objective:      /86 (BP Location: Right arm, Patient Position: Sitting, BP Method: Medium (Automatic))   Pulse (!) 111   Temp 98.5 °F (36.9 °C) (Temporal)   Resp 18   Ht 5' 4" (1.626 m)   Wt 56.9 kg (125 lb 6.4 oz)   SpO2 98%   BMI 21.52 kg/m²   Estimated body mass index is 21.52 kg/m² as calculated from the following:    Height as of this encounter: 5' 4" (1.626 m).    Weight as of this encounter: 56.9 kg (125 lb 6.4 oz).  Physical Exam   Constitutional: He is oriented to person, place, and time and well-developed, well-nourished, and in no distress. No distress.   Cardiovascular: Normal rate, regular rhythm and normal heart sounds.   Pulmonary/Chest: Effort normal and breath sounds normal. No accessory muscle usage. No respiratory distress. He has no decreased breath sounds. He has no wheezes. He has no rhonchi.   Abdominal: Soft. Bowel sounds are normal.   Musculoskeletal:      Left knee: He exhibits normal range of motion and no swelling. No " tenderness found.   Neurological: He is alert and oriented to person, place, and time. Gait normal. GCS score is 15.   Skin: Skin is warm and dry. He is not diaphoretic.   Psychiatric: Mood, memory, affect and judgment normal. His mood appears not anxious. His affect is not blunt and not inappropriate. He is not agitated. He does not express impulsivity. He does not exhibit a depressed mood. He expresses no homicidal and no suicidal ideation. He expresses no suicidal plans and no homicidal plans. He exhibits ordered thought content and normal new learning ability.   Nursing note reviewed.        Assessment and Plan:   Encounter to establish care  Routine lab, will call with results  Refill medication  Referral to psychiatry  Follow up in 8 weeks and as needed      Problem List Items Addressed This Visit     None      Visit Diagnoses     Encounter to establish care    -  Primary    Anxiety and depression        Relevant Medications    busPIRone (BUSPAR) 15 MG tablet    desvenlafaxine succinate (PRISTIQ) 100 MG Tb24    Other Relevant Orders    Ambulatory referral/consult to Psychiatry    Spondylo-arthropathy        Relevant Medications    gabapentin (NEURONTIN) 600 MG tablet    Laboratory exam ordered as part of routine general medical examination        Relevant Orders    CBC Auto Differential    TSH w/reflex to FT4    Comprehensive Metabolic Panel    Lipid Panel          Risks, benefits, and alternatives discussed with patient, Patient verbalized understanding of discussed plan of care. Asked patient if any further questions, answered no.  Follow up:   Follow up in about 8 weeks (around 12/28/2020).     Other Orders Placed This Visit:  Orders Placed This Encounter   Procedures    CBC Auto Differential     Standing Status:   Future     Number of Occurrences:   1     Standing Expiration Date:   1/2/2022    TSH w/reflex to FT4     Standing Status:   Future     Number of Occurrences:   1     Standing Expiration Date:    1/1/2022    Comprehensive Metabolic Panel     Standing Status:   Future     Number of Occurrences:   1     Standing Expiration Date:   1/1/2022    Lipid Panel     Standing Status:   Future     Number of Occurrences:   1     Standing Expiration Date:   1/2/2022    Ambulatory referral/consult to Psychiatry     Standing Status:   Future     Standing Expiration Date:   12/2/2021     Referral Priority:   Routine     Referral Type:   Psychiatric     Referral Reason:   Specialty Services Required     Referred to Provider:   Grisel Lilly NP     Requested Specialty:   Psychiatry     Number of Visits Requested:   1           Dee Heard    Problem List Items Addressed This Visit     None      Visit Diagnoses     Encounter to establish care    -  Primary    Anxiety and depression        Relevant Medications    busPIRone (BUSPAR) 15 MG tablet    desvenlafaxine succinate (PRISTIQ) 100 MG Tb24    Other Relevant Orders    Ambulatory referral/consult to Psychiatry    Spondylo-arthropathy        Relevant Medications    gabapentin (NEURONTIN) 600 MG tablet    Laboratory exam ordered as part of routine general medical examination        Relevant Orders    CBC Auto Differential    TSH w/reflex to FT4    Comprehensive Metabolic Panel    Lipid Panel

## 2020-11-12 ENCOUNTER — TELEPHONE (OUTPATIENT)
Dept: FAMILY MEDICINE | Facility: CLINIC | Age: 30
End: 2020-11-12

## 2020-11-12 NOTE — TELEPHONE ENCOUNTER
----- Message from Nohemy Buchanan sent at 11/12/2020  2:08 PM CST -----  Regarding: Sooner Apoointment Request  Type:  Sooner Apoointment Request    Caller is requesting a sooner appointment.  Caller declined first available appointment listed below.  Caller will not accept being placed on the waitlist and is requesting a message be sent to doctor.  Name of Caller: Frankie Fragoso   When is the first available appointment? 11/23/20  Symptoms: medication refill  Would the patient rather a call back or a response via [facility]?  Call back  Best Call Back Number:[phone]  Additional Information:

## 2020-11-29 DIAGNOSIS — M47.819 SPONDYLO-ARTHROPATHY: ICD-10-CM

## 2020-11-30 RX ORDER — GABAPENTIN 600 MG/1
TABLET ORAL
Qty: 90 TABLET | Refills: 0 | Status: SHIPPED | OUTPATIENT
Start: 2020-11-30